# Patient Record
Sex: MALE | Race: WHITE | HISPANIC OR LATINO
[De-identification: names, ages, dates, MRNs, and addresses within clinical notes are randomized per-mention and may not be internally consistent; named-entity substitution may affect disease eponyms.]

---

## 2018-02-03 PROBLEM — Z00.00 ENCOUNTER FOR PREVENTIVE HEALTH EXAMINATION: Status: ACTIVE | Noted: 2018-02-03

## 2018-03-05 ENCOUNTER — APPOINTMENT (OUTPATIENT)
Dept: DERMATOLOGY | Facility: CLINIC | Age: 38
End: 2018-03-05
Payer: MEDICAID

## 2018-03-05 PROCEDURE — 99203 OFFICE O/P NEW LOW 30 MIN: CPT

## 2023-10-04 ENCOUNTER — EMERGENCY (EMERGENCY)
Facility: HOSPITAL | Age: 43
LOS: 1 days | Discharge: HOME | End: 2023-10-04
Attending: STUDENT IN AN ORGANIZED HEALTH CARE EDUCATION/TRAINING PROGRAM
Payer: SELF-PAY

## 2023-10-04 VITALS
OXYGEN SATURATION: 100 % | HEIGHT: 68 IN | TEMPERATURE: 98 F | HEART RATE: 74 BPM | DIASTOLIC BLOOD PRESSURE: 75 MMHG | RESPIRATION RATE: 20 BRPM | SYSTOLIC BLOOD PRESSURE: 118 MMHG | WEIGHT: 212.08 LBS

## 2023-10-04 LAB
RAPID RVP RESULT: SIGNIFICANT CHANGE UP
S PYO DNA THROAT QL NAA+PROBE: SIGNIFICANT CHANGE UP
SARS-COV-2 RNA SPEC QL NAA+PROBE: SIGNIFICANT CHANGE UP

## 2023-10-04 PROCEDURE — 99284 EMERGENCY DEPT VISIT MOD MDM: CPT

## 2023-10-04 PROCEDURE — 87798 DETECT AGENT NOS DNA AMP: CPT

## 2023-10-04 PROCEDURE — 99283 EMERGENCY DEPT VISIT LOW MDM: CPT

## 2023-10-04 PROCEDURE — T1013: CPT

## 2023-10-04 PROCEDURE — 87651 STREP A DNA AMP PROBE: CPT

## 2023-10-04 PROCEDURE — 0225U NFCT DS DNA&RNA 21 SARSCOV2: CPT

## 2023-10-04 RX ORDER — POLYMYXIN B SULF/TRIMETHOPRIM 10000-1/ML
1 DROPS OPHTHALMIC (EYE)
Qty: 1 | Refills: 0
Start: 2023-10-04 | End: 2023-10-10

## 2023-10-04 RX ORDER — ACETAMINOPHEN 500 MG
975 TABLET ORAL ONCE
Refills: 0 | Status: COMPLETED | OUTPATIENT
Start: 2023-10-04 | End: 2023-10-04

## 2023-10-04 RX ADMIN — Medication 975 MILLIGRAM(S): at 18:34

## 2023-10-04 NOTE — ED PROVIDER NOTE - NSFOLLOWUPINSTRUCTIONS_ED_ALL_ED_FT
Conjunctivitis    Conjunctivitis is an inflammation of the clear membrane that covers the white part of your eye and the inner surface of your eyelid (conjunctiva). Symptoms include eye redness, eye pain, tearing and drainage, crusting of eyelids, swollen eyelids, and light sensitivity. Conjunctivitis may be contagious and easily spread from person to person. It can be caused by a virus, bacteria, or as part of an allergic reaction; the treatment depends on the type of conjunctivitis suspected. Avoid touching or rubbing your eyes and wipe away any drainage gently with a warm wet washcloth. Do not wear contact lenses until the inflammation is gone – wear glasses until your health care provider says it is safe to wear contact again. Do not share towels or washcloths that may spread the infection and wash your hands frequently.    SEEK IMMEDIATE MEDICAL CARE IF YOU HAVE ANY OF THE FOLLOWING SYMPTOMS: increasing pain, blurry vision, blindness, fever, or facial pain/redness/swelling.     Please follow up with primary care physician within 3 days. Please return to ED if symptoms acutely worsen.

## 2023-10-04 NOTE — ED PROVIDER NOTE - NS ED ATTENDING STATEMENT MOD
This was a shared visit with the RAHEL. I reviewed and verified the documentation and independently performed the documented:

## 2023-10-04 NOTE — ED PROVIDER NOTE - PHYSICAL EXAMINATION
General: Patient sitting in no acute distress  Eyes: (+) redness bilat, EOM intact, PERRLA   Ears: Non-tender to palpation, TM nl and clear   Mouth: (+) mild erythema, tonsils 2+, moist mucosa    Head: normocephalic, atraumatic   Chest: S1+S2 noted, normal rate and rhythm  Lungs: clear and equal bilat, no accessory muscle use   Abd: soft, non-tender x4, no guarding   Neuro: A+Ox4, spontaneously moving all limbs  Psych: calm and cooperative

## 2023-10-04 NOTE — ED PROVIDER NOTE - ATTENDING APP SHARED VISIT CONTRIBUTION OF CARE
43M presenting for evaluation of conjunctival injections, sore throat, headache. Has hx of pre-diabetes, o/w generally healthy. On examination appears like a medial angle conjunctivitis, given other sx likely ?adenovirus; though will cover with otic gtt; patient is not a contact wearer; follow up studies, reassess, dispo.

## 2023-10-04 NOTE — ED ADULT TRIAGE NOTE - MODE OF ARRIVAL
Telephone Encounter by Ghanshyam Monique RN at 07/11/17 03:06 PM     Author:  Ghanshyam Monique RN Service:  (none) Author Type:  Registered Nurse     Filed:  07/11/17 03:10 PM Encounter Date:  6/30/2017 Status:  Signed     :  Ghanshyam Monique RN (Registered Nurse)            Spoke with daughter, Avel Arita, reviewed medications & provided pre op med instructions. She v/u of information given & had no further questions at this time.      Updated pre op instruction letter.[SW1.1M]         Revision History        User Key Date/Time User Provider Type Action    > SW1.1 07/11/17 03:10 PM Ghanshyam Monique RN Registered Nurse Sign    M - Manual Walk in [employer]

## 2023-10-04 NOTE — ED PROVIDER NOTE - CLINICAL SUMMARY MEDICAL DECISION MAKING FREE TEXT BOX
43 year old male with pmhx pre-diabetes presents to ED for 7 days congestion and 3 days of red eyes with discharge worsened in the morning. While in ED, RVP obtained and patient given Tylenol. 43 year old male with pmhx pre-diabetes presents to ED for 7 days congestion and 3 days of red eyes with discharge worsened in the morning. While in ED, RVP obtained, strep swab obtained and patient given Tylenol. Likely viral upper respiratory infection vs/and bacterial conjunctivitis vs viral conjunctivitis. Encouraged patient to increase hydration, supportive care measures such as Tylenol or Motrin. Eye drops sent to pharmacy. Patient advised that pending results will be published onto patient portal.

## 2023-10-04 NOTE — ED PROVIDER NOTE - PATIENT PORTAL LINK FT
You can access the FollowMyHealth Patient Portal offered by [facility] by registering at the following website: [email or web address]. By joining [facility]’s FollowMyHealth portal, you will also be able to view your health information using other applications (apps) compatible with our system.

## 2023-10-04 NOTE — ED PROVIDER NOTE - OBJECTIVE STATEMENT
43 year old male with pmhx pre-diabetes presents to ED for 7 days congestion and 3 days of red eyes with discharge worsened in the morning. Patient states he also has mild headache and throat pain. He took tylenol last dose at 8 am today and cough medicine periodically. He works as  and is around children often. He does not wear contacts. He has had similar eye redness before and was given eye drops which resolved it. He denies eye itchiness, no chest pain, no shortness of breath, no nausea/vomiting, no fever, no vision issues. He denies seasonal allergies 43 year old male with pmhx pre-diabetes presents to ED for 7 days congestion and 3 days of red eyes with discharge worsened in the morning. Patient states he also has mild headache and throat pain. He took tylenol last dose at 8 am today and cough medicine periodically. He works as  and is around children often. He does not wear contacts. He has had similar eye redness before and was given eye drops which resolved it. He denies eye itchiness, no chest pain, no shortness of breath, no nausea/vomiting, no fever, no vision issues. He denies seasonal allergies. Subjective fevers 43 year old male with pmhx pre-diabetes presents to ED for 7 days congestion and 3 days of red eyes with discharge worsened in the morning. Patient states he also has mild headache and throat pain. He took tylenol last dose at 8 am today and cough medicine periodically. He works as  and is around children often. He does not wear contacts. He has had similar eye redness before and was given eye drops which resolved it. He denies eye itchiness, no chest pain, no shortness of breath, no nausea/vomiting, no fever, no vision issues. He denies seasonal allergies. Subjective fevers reported.